# Patient Record
Sex: MALE | Race: WHITE | Employment: FULL TIME | URBAN - METROPOLITAN AREA
[De-identification: names, ages, dates, MRNs, and addresses within clinical notes are randomized per-mention and may not be internally consistent; named-entity substitution may affect disease eponyms.]

---

## 2023-04-18 RX ORDER — OMEPRAZOLE 20 MG/1
20 CAPSULE, DELAYED RELEASE ORAL DAILY
COMMUNITY

## 2023-04-18 RX ORDER — CHLORAL HYDRATE 500 MG
CAPSULE ORAL DAILY
COMMUNITY

## 2023-04-18 RX ORDER — LOSARTAN POTASSIUM 50 MG/1
50 TABLET ORAL 2 TIMES DAILY
COMMUNITY

## 2023-04-18 RX ORDER — ATORVASTATIN CALCIUM 10 MG/1
10 TABLET, FILM COATED ORAL DAILY
COMMUNITY

## 2023-04-18 RX ORDER — ACETAMINOPHEN 160 MG
TABLET,DISINTEGRATING ORAL
COMMUNITY

## 2023-04-18 RX ORDER — ASPIRIN 81 MG/1
81 TABLET ORAL DAILY
COMMUNITY

## 2023-04-18 RX ORDER — HYDROCHLOROTHIAZIDE 12.5 MG/1
12.5 CAPSULE, GELATIN COATED ORAL DAILY
COMMUNITY

## 2023-04-26 ENCOUNTER — HOSPITAL ENCOUNTER (OUTPATIENT)
Age: 68
Setting detail: OUTPATIENT SURGERY
Discharge: HOME | End: 2023-04-26
Attending: INTERNAL MEDICINE | Admitting: INTERNAL MEDICINE
Payer: MEDICARE

## 2023-04-26 ENCOUNTER — ANESTHESIA EVENT (OUTPATIENT)
Dept: ENDOSCOPY | Age: 68
End: 2023-04-26
Payer: MEDICARE

## 2023-04-26 ENCOUNTER — ANESTHESIA (OUTPATIENT)
Dept: ENDOSCOPY | Age: 68
End: 2023-04-26
Payer: MEDICARE

## 2023-04-26 VITALS
TEMPERATURE: 98.2 F | WEIGHT: 250 LBS | OXYGEN SATURATION: 97 % | SYSTOLIC BLOOD PRESSURE: 147 MMHG | HEIGHT: 71 IN | HEART RATE: 76 BPM | RESPIRATION RATE: 18 BRPM | DIASTOLIC BLOOD PRESSURE: 84 MMHG | BODY MASS INDEX: 35 KG/M2

## 2023-04-26 DIAGNOSIS — Z12.11 SPECIAL SCREENING FOR MALIGNANT NEOPLASMS, COLON: ICD-10-CM

## 2023-04-26 PROCEDURE — 3700000000 HC ANESTHESIA ATTENDED CARE: Performed by: INTERNAL MEDICINE

## 2023-04-26 PROCEDURE — 7100000010 HC PHASE II RECOVERY - FIRST 15 MIN: Performed by: INTERNAL MEDICINE

## 2023-04-26 PROCEDURE — 6360000002 HC RX W HCPCS

## 2023-04-26 PROCEDURE — 88305 TISSUE EXAM BY PATHOLOGIST: CPT

## 2023-04-26 PROCEDURE — 2709999900 HC NON-CHARGEABLE SUPPLY: Performed by: INTERNAL MEDICINE

## 2023-04-26 PROCEDURE — 3700000001 HC ADD 15 MINUTES (ANESTHESIA): Performed by: INTERNAL MEDICINE

## 2023-04-26 PROCEDURE — 2500000003 HC RX 250 WO HCPCS

## 2023-04-26 PROCEDURE — 3609010600 HC COLONOSCOPY POLYPECTOMY SNARE/COLD BIOPSY: Performed by: INTERNAL MEDICINE

## 2023-04-26 PROCEDURE — 7100000011 HC PHASE II RECOVERY - ADDTL 15 MIN: Performed by: INTERNAL MEDICINE

## 2023-04-26 RX ORDER — SODIUM CHLORIDE, SODIUM LACTATE, POTASSIUM CHLORIDE, CALCIUM CHLORIDE 600; 310; 30; 20 MG/100ML; MG/100ML; MG/100ML; MG/100ML
INJECTION, SOLUTION INTRAVENOUS CONTINUOUS
Status: DISCONTINUED | OUTPATIENT
Start: 2023-04-26 | End: 2023-04-26 | Stop reason: HOSPADM

## 2023-04-26 RX ORDER — LIDOCAINE HYDROCHLORIDE 20 MG/ML
INJECTION, SOLUTION INFILTRATION; PERINEURAL PRN
Status: DISCONTINUED | OUTPATIENT
Start: 2023-04-26 | End: 2023-04-26 | Stop reason: SDUPTHER

## 2023-04-26 RX ORDER — SODIUM CHLORIDE 0.9 % (FLUSH) 0.9 %
5-40 SYRINGE (ML) INJECTION EVERY 12 HOURS SCHEDULED
Status: DISCONTINUED | OUTPATIENT
Start: 2023-04-26 | End: 2023-04-26 | Stop reason: HOSPADM

## 2023-04-26 RX ORDER — LIDOCAINE HYDROCHLORIDE 10 MG/ML
0.3 INJECTION, SOLUTION EPIDURAL; INFILTRATION; INTRACAUDAL; PERINEURAL
Status: DISCONTINUED | OUTPATIENT
Start: 2023-04-26 | End: 2023-04-26 | Stop reason: HOSPADM

## 2023-04-26 RX ORDER — MEPERIDINE HYDROCHLORIDE 25 MG/ML
12.5 INJECTION INTRAMUSCULAR; INTRAVENOUS; SUBCUTANEOUS EVERY 5 MIN PRN
Status: CANCELLED | OUTPATIENT
Start: 2023-04-26

## 2023-04-26 RX ORDER — SODIUM CHLORIDE 9 MG/ML
INJECTION, SOLUTION INTRAVENOUS PRN
Status: DISCONTINUED | OUTPATIENT
Start: 2023-04-26 | End: 2023-04-26 | Stop reason: HOSPADM

## 2023-04-26 RX ORDER — ONDANSETRON 2 MG/ML
4 INJECTION INTRAMUSCULAR; INTRAVENOUS
Status: CANCELLED | OUTPATIENT
Start: 2023-04-26 | End: 2023-04-27

## 2023-04-26 RX ORDER — PROPOFOL 10 MG/ML
INJECTION, EMULSION INTRAVENOUS PRN
Status: DISCONTINUED | OUTPATIENT
Start: 2023-04-26 | End: 2023-04-26 | Stop reason: SDUPTHER

## 2023-04-26 RX ORDER — SODIUM CHLORIDE 9 MG/ML
25 INJECTION, SOLUTION INTRAVENOUS PRN
Status: CANCELLED | OUTPATIENT
Start: 2023-04-26

## 2023-04-26 RX ORDER — SODIUM CHLORIDE 0.9 % (FLUSH) 0.9 %
5-40 SYRINGE (ML) INJECTION EVERY 12 HOURS SCHEDULED
Status: CANCELLED | OUTPATIENT
Start: 2023-04-26

## 2023-04-26 RX ORDER — OXYCODONE HYDROCHLORIDE 5 MG/1
5 TABLET ORAL PRN
Status: CANCELLED | OUTPATIENT
Start: 2023-04-26 | End: 2023-04-26

## 2023-04-26 RX ORDER — SODIUM CHLORIDE 0.9 % (FLUSH) 0.9 %
5-40 SYRINGE (ML) INJECTION PRN
Status: DISCONTINUED | OUTPATIENT
Start: 2023-04-26 | End: 2023-04-26 | Stop reason: HOSPADM

## 2023-04-26 RX ORDER — SODIUM CHLORIDE 0.9 % (FLUSH) 0.9 %
5-40 SYRINGE (ML) INJECTION PRN
Status: CANCELLED | OUTPATIENT
Start: 2023-04-26

## 2023-04-26 RX ORDER — OXYCODONE HYDROCHLORIDE 5 MG/1
10 TABLET ORAL PRN
Status: CANCELLED | OUTPATIENT
Start: 2023-04-26 | End: 2023-04-26

## 2023-04-26 RX ADMIN — PROPOFOL 100 MCG/KG/MIN: 10 INJECTION, EMULSION INTRAVENOUS at 13:42

## 2023-04-26 RX ADMIN — LIDOCAINE HYDROCHLORIDE 50 MG: 20 INJECTION, SOLUTION INFILTRATION; PERINEURAL at 13:41

## 2023-04-26 RX ADMIN — PROPOFOL 80 MG: 10 INJECTION, EMULSION INTRAVENOUS at 13:41

## 2023-04-26 ASSESSMENT — LIFESTYLE VARIABLES: SMOKING_STATUS: 0

## 2023-04-26 ASSESSMENT — PAIN - FUNCTIONAL ASSESSMENT: PAIN_FUNCTIONAL_ASSESSMENT: NONE - DENIES PAIN

## 2023-04-26 ASSESSMENT — ENCOUNTER SYMPTOMS: SHORTNESS OF BREATH: 0

## 2023-04-26 ASSESSMENT — PAIN SCALES - GENERAL: PAINLEVEL_OUTOF10: 0

## 2023-04-26 NOTE — PROGRESS NOTES
Discharge instructions given. Pt's wife verbalized understanding denies any questions/ needs at this time. Pt is a/o,vss. Transported pt via wheelchair to vehicle for discharge home.

## 2023-04-26 NOTE — DISCHARGE INSTRUCTIONS
PATIENT INSTRUCTIONS  POST-SEDATION    Troy Nino          IMMEDIATELY FOLLOWING PROCEDURE:    Do not drive or operate machinery for the first twenty four hours after surgery. Do not make any important decisions for twenty four hours after surgery or while taking narcotic pain medications or sedatives. You should NOT BE LEFT UNATTENDED OR ALONE. A responsible adult should be with you for the rest of the day of your procedure and also during the night for your protection and safety. You may experience some light headedness. Rest at home with activity as tolerated. You may not need to go to bed, but it is important to rest for the next 24 hours. You should not engage in athletic sports such as basketball, volleyball, jogging, skating, or activities requiring refined motor skills for 24 hours. If you develop intractable nausea and vomiting or a severe headache please notify your doctor immediately. You are not expected to have any fever, but if you feel warm, take your temperature. If you have a fever 101 degrees or higher, call your doctor. If you have had an Endoscopy:   *Eat lightly for your first meal and gradually resume your normal / prescribed diet. DO NOT eat or drink until your gag reflex returns. *If you have had a colonoscopy, do not expect a normal bowel movement for approximately three days due to the cleansing of the large intestine prior to colonoscopy. ONCE YOU ARE HOME, IF YOU SHOULD HAVE:  Difficulty in breathing, persistent nausea or vomiting, bleeding you feel is excessive, or pain that is unusual, increased abdominal bloating, or any swelling, fever / chills, call your physician. If you cannot contact your physician, but feel that your signs and symptoms need a physician's attention, go to the Emergency Department. FOLLOW-UP:    Please follow up with NASRIN Field as scheduled or needed. Dr. Garcia Kumar office will call you with the biopsy findings.   Call

## 2023-04-26 NOTE — ANESTHESIA POSTPROCEDURE EVALUATION
Department of Anesthesiology  Postprocedure Note    Patient: Casandra Silverman  MRN: [id number]  YOB: 1955  Date of evaluation: 4/26/2023      Procedure Summary     Date: 04/26/23 Room / Location: [facility] ENDO 01 / [facility]    Anesthesia Start: [address] Anesthesia Stop: 3550    Procedure: COLONOSCOPY POLYPECTOMY SNARE/COLD BIOPSY Diagnosis:       Special screening for malignant neoplasms, colon      (SCREENING)    Surgeons: Keagan Crews DO Responsible Provider: Melvina Avitia MD    Anesthesia Type: TIVA ASA Status: 2          Anesthesia Type: No value filed. Keith Phase I: Keith Score: 10    Keith Phase II: Keith Score: 10      Anesthesia Post Evaluation    Comments: Postoperative Anesthesia Note    Name:    Casandra Silverman  MRN:      [id number]    Patient Vitals in the past 12 hrs:  04/26/23 1412, BP:(!) 147/84, Temp:98.2 °F (36.8 °C), Temp src:Temporal, Pulse:76, Resp:18, SpO2:97 %  04/26/23 1400, BP:138/85, Temp:98.2 °F (36.8 °C), Temp src:Temporal, Pulse:87, Resp:18, SpO2:97 %  04/26/23 1243, BP:(!) 160/80, Temp:97.5 °F (36.4 °C), Pulse:85, Resp:18, SpO2:95 %     LABS:    CBC  No results found for: WBC, HGB, HCT, PLT  RENAL  No results found for: NA, K, CL, CO2, BUN, CREATININE, GLUCOSE  COAGS  No results found for: PROTIME, INR, APTT    Intake & Output:  @22WHON@    Nausea & Vomiting:  No    Level of Consciousness:  Awake    Pain Assessment:  Adequate analgesia    Anesthesia Complications:  No apparent anesthetic complications    SUMMARY      Vital signs stable  OK to discharge from Stage I post anesthesia care.   Care transferred from Anesthesiology department on discharge from perioperative area

## 2023-04-26 NOTE — H&P
[facility] ENDOSCOPY  Outpatient Procedure H&P    Patient: Edward Dan MRN: [id number]     YOB: 1955  Age: 74 y.o. Sex: male    Unit: [facility] ENDOSCOPY Room/Bed: ENDO/NONE Location: [address]     Procedure: Procedure(s):  COLON W/ANES. (13:15)    Indication:CRCS  Referring  Physician:        Brief history: initial    Nurses past medical history notes reviewed and agreed. Medications reviewed. Allergies: Patient has no known allergies. Allergies noted: Yes     Past Medical History:   Past Medical History:   Diagnosis Date    Hyperlipidemia     Hypertension        Past Surgical History:   Past Surgical History:   Procedure Laterality Date    CHOLECYSTECTOMY      SKIN CANCER EXCISION      BASAL CELL       Social History:   Social History     Socioeconomic History    Marital status:      Spouse name: Not on file    Number of children: Not on file    Years of education: Not on file    Highest education level: Not on file   Occupational History    Not on file   Tobacco Use    Smoking status: Never    Smokeless tobacco: Never   Vaping Use    Vaping Use: Never used   Substance and Sexual Activity    Alcohol use: Never    Drug use: Never    Sexual activity: Not on file   Other Topics Concern    Not on file   Social History Narrative    Not on file     Social Determinants of Health     Financial Resource Strain: Not on file   Food Insecurity: Not on file   Transportation Needs: Not on file   Physical Activity: Not on file   Stress: Not on file   Social Connections: Not on file   Intimate Partner Violence: Not on file   Housing Stability: Not on file       Family History: History reviewed. No pertinent family history. Home Medications:   Prior to Admission medications    Medication Sig Start Date End Date Taking?  Authorizing Provider   Omega-3 1000 MG CAPS Take by mouth daily   Yes Historical Provider, MD   omeprazole (PRILOSEC) 20 MG delayed release capsule Take 1 capsule

## 2023-04-26 NOTE — PROGRESS NOTES
Pre-Operative:  1. Patient/Caregiver identifies - states name and date of birth. 2.  The patient is free from signs and symptoms of injury. 3.  The patient receives appropriate medication(s), safely administered during the Perioperative period. 4.  The patients's fluid, electrolyte, and acid-base balances are established preoperatively. 5.  The patient's pulmonary function is established preoperatively. 6.  The patient's cardiovascular status is established preoperatively. 7.  The patient / caregiver demonstrates knowledge of nutritional management related to the operative or other invasive procedure. 8.  The patient/caregiver demonstrates knowledge of medication management. 9.  The patient/caregiver demonstrates knowledge of pain management. 10.  The patient/caregiver participates in decisions affection his or her Perioperative plan of care. 11. The patient's care is consistent with the individualized Perioperative plan of care. 12.  The patient's right to privacy is maintained. 13. The patient is the recipient of competent and ethical care within legal standards of practice. 14.  The patient's value system, lifestyle, ethnicity, and culture are considered, respected, and incorporated in the Perioperative plan of care and understands special services available. 15.  The patient demonstrates and/or reports adequate pain control throughout the the Perioperative period. 16.  The patient's neurological status is established preoperatively. 17.  The patient/caregiver demonstrates knowledge of the expected responses to the endoscopy procedure. 18.  Patient/Caregiver has reduced anxiety. Interventions- Familiarize with environment and equipment. 19. Patient/Caregiver verbalizes understanding of Phase II and/or Phase I process. 20.  Patient pain level is established preoperatively using age appropriate pain scale. 21.   The patient will move to fall risk upon sedation- during and through the recovery

## 2023-04-26 NOTE — ANESTHESIA PRE PROCEDURE
dentition      Pulmonary: breath sounds clear to auscultation  (+) sleep apnea: on CPAP,      (-) COPD, asthma, shortness of breath, recent URI and not a current smoker                           Cardiovascular:    (+) hypertension:, hyperlipidemia    (-) pacemaker, past MI, CAD, CABG/stent, dysrhythmias,  angina and  CHF      Rhythm: regular  Rate: normal           Beta Blocker:  Not on Beta Blocker         Neuro/Psych:   Negative Neuro/Psych ROS     (-) seizures, TIA and CVA           GI/Hepatic/Renal:   (+) GERD (OTC PRN): well controlled, bowel prep,      (-) liver disease, no renal disease and no morbid obesity       Endo/Other:    (+) : arthritis:., malignancy/cancer (SKIN). (-) diabetes mellitus, hypothyroidism, hyperthyroidism, blood dyscrasia               Abdominal:       Abdomen: soft. Vascular: negative vascular ROS. Other Findings:           Anesthesia Plan      TIVA     ASA 2       Induction: intravenous. MIPS: Prophylactic antiemetics administered. Anesthetic plan and risks discussed with patient. Plan discussed with CRNA. This pre-anesthesia assessment may be used as a history and physical.    DOS STAFF ADDENDUM:    Pt seen and examined, chart reviewed (including anesthesia, drug and allergy history). No interval changes to history and physical examination. Anesthetic plan, risks, benefits, alternatives, and personnel involved discussed with patient. Patient verbalized an understanding and agrees to proceed.       Denise Shoemaker MD  April 26, 2023  12:51 PM      Denise Shoemaker MD   4/26/2023

## 2023-04-26 NOTE — PROCEDURES
COLONOSCOPY     Patient: Hailee Shaffer MRN: [id number]   YOB: 1955 Age: 74 y.o. Sex: male   Unit: [facility] ENDOSCOPY Room/Bed: ENDO/NONE Location: [address]    Admitting Physician: Enma Dodson     Primary Care Physician: RAKAN Syed CNP      DATE OF PROCEDURE: 4/26/2023  PROCEDURE: Colonoscopy    PREOPERATIVE DIAGNOSIS: SCREENING  HPI: This is a 74y.o. year old male who presents today with Fx Hx of Colon Ca. ENDOSCOPIST: Siva Ortega DO    POSTOPERATIVE DIAGNOSIS:    4 mm Ascending colon polyp    PLAN:   Repeat colonoscopy in 5 years    INFORMED CONSENT:  Informed consent for colonoscopy was obtained. The benefits and risks including adverse medicine reaction and perforation have been explained. The patient's questions were answered and the patient agreed to proceed. ASA: ASA 2 - Patient with mild systemic disease with no functional limitations     SEDATION: MAC    The patient's vital signs, cardiac status, pulmonary status, abdominal status and mental status were stable for the procedure. The patient's vital signs and respiratory function as monitored by oxygen saturation remained stable. COLON PREPARATION:  The patient was given a split colon preparation and the preparation was adequate. Procedure Details: An anal exam was performed and this was unremarkable. A digital rectal exam was performed and no masses palpated. The Olympus videocolonoscope  was inserted in the rectum and carefully advanced to the cecum as identified by IC valve, crow's foot appearance and appendix. The cecum was photodocumented. The colonoscope was slowly withdrawn and retrograde examination of the colon was carefully performed with inspection around and between folds. The ascending colon and cecum were intubated twice with repeat antegrade and retrograde examination. Small sessile polyp noted in the ascending colon was removed with cold snare.

## (undated) DEVICE — ENDO CARRY-ON PROCEDURE KIT INCLUDES SUCTION TUBING, LUBRICANT, GAUZE, BIOHAZARD STICKER, TRANSPORT PAD AND INTERCEPT BEDSIDE KIT.: Brand: ENDO CARRY-ON PROCEDURE KIT

## (undated) DEVICE — ELECTRODE,RADIOTRANSLUCENT,FOAM,3PK: Brand: MEDLINE

## (undated) DEVICE — TRAP POLYP ETRAP

## (undated) DEVICE — SNARE VASC L240CM LOOP W10MM SHTH DIA2.4MM RND STIFF CLD